# Patient Record
(demographics unavailable — no encounter records)

---

## 2024-11-19 NOTE — HEALTH RISK ASSESSMENT
[Very Good] : ~his/her~ current health as very good [Excellent] : ~his/her~  mood as  excellent [Yes] : Yes [4 or more  times a week (4 pts)] : 4 or more  times a week (4 points) [1 or 2 (0 pts)] : 1 or 2 (0 points) [Never (0 pts)] : Never (0 points) [No] : In the past 12 months have you used drugs other than those required for medical reasons? No [No falls in past year] : Patient reported no falls in the past year [0] : 2) Feeling down, depressed, or hopeless: Not at all (0) [PHQ-2 Negative - No further assessment needed] : PHQ-2 Negative - No further assessment needed [Never] : Never [Patient reported mammogram was normal] : Patient reported mammogram was normal [None] : None [With Significant Other] : lives with significant other [# of Members in Household ___] :  household currently consist of [unfilled] member(s) [Unemployed] : unemployed [College] : College [] :  [# Of Children ___] : has [unfilled] children [Sexually Active] : sexually active [Feels Safe at Home] : Feels safe at home [Fully functional (bathing, dressing, toileting, transferring, walking, feeding)] : Fully functional (bathing, dressing, toileting, transferring, walking, feeding) [Fully functional (using the telephone, shopping, preparing meals, housekeeping, doing laundry, using] : Fully functional and needs no help or supervision to perform IADLs (using the telephone, shopping, preparing meals, housekeeping, doing laundry, using transportation, managing medications and managing finances) [Smoke Detector] : smoke detector [Carbon Monoxide Detector] : carbon monoxide detector [Seat Belt] :  uses seat belt [Sunscreen] : uses sunscreen [Reviewed no changes] : Reviewed, no changes [Discussed at today's visit] : Advance Directives Discussed at today's visit [Designated Healthcare Proxy] : Designated healthcare proxy [Name: ___] : Health Care Proxy's Name: [unfilled]  [NO] : No [de-identified] : one wine daily [Audit-CScore] : 4 [de-identified] : WALKING DAILY [de-identified] : GOOD [BWW2Hrate] : 0 [Change in mental status noted] : No change in mental status noted [Reports changes in hearing] : Reports no changes in hearing [Reports changes in vision] : Reports no changes in vision [Reports changes in dental health] : Reports no changes in dental health [MammogramDate] : 05/24 [AdvancecareDate] : 11/24

## 2024-11-19 NOTE — COUNSELING
[None] : None [Good understanding] : Patient has a good understanding of lifestyle changes and the steps needed to achieve self management goals [de-identified] : Continue diet and exercise

## 2024-11-19 NOTE — HISTORY OF PRESENT ILLNESS
[FreeTextEntry1] : 84-year-old female with good general health with chronic medical history includes hypertension on amlodipine, hyperlipidemia for which she takes atorvastatin, carotid plaque without stenosis, osteopenia and skin cancer for which she follows with dermatology presents for her yearly physical.  Patient had a coronary calcium CAT scan February 2023 with score = 181 with echocardiogram February 2023 showing normal LVEF with mild to moderate MR and moderately severe TR. For her hyperlipidemia she is on atorvastatin For her hypertension she is on amlodipine.  The patient had a syncopal episode November 2022 with head trauma sustaining C7 fracture without surgical intervention and healed with immobilization with a cervical collar.   She recently had another syncopal episode October 2024 therefore saw cardiology with echocardiogram showing normal LVEF with grade 1 diastolic dysfunction with mild to moderate MR/TR. Also carotid duplex showing plaque without stenosis. Internal loop recorder recommended and has been inserted.  2019 the patient had lightheadedness and found to be hypertensive therefore amlodipine started with improved blood pressure without further symptoms.  Patient's main issues are known lumbar degenerative disc disease with right radiculopathy with chronic pain which is better.  Patient had a right total hip replacement May 2017. Also the patient had a trauma with resultant right patella fracture which was repaired December 2018 with patient having no continued issues.  The patient had issues with both of her shoulders July 2021 with MRIs showing tears therefore patient saw orthopedic with physical therapy with improvement.  She also has history of skin cancers most recently Mohs surgery Right face January 2021.  She also had cataract surgery on the right October 2020.  Otherwise she feels well without complaints including no chest pain, palpitations, shortness of breath or edema.  She is  with 4 children but unfortunately 1 child her son passed away from pancreatic cancer in 2023.

## 2024-11-19 NOTE — DATA REVIEWED
[FreeTextEntry1] : Coronary calcium CAT scan February 2023 with score = 181  Echocardiogram November 2024 with normal LVEF with grade 1 diastolic dysfunction with mild to moderate MR/TR Carotid duplex November 2024 with plaque without stenosis

## 2024-11-19 NOTE — PHYSICAL EXAM
[General Appearance - Alert] : alert [General Appearance - In No Acute Distress] : in no acute distress [Sclera] : the sclera and conjunctiva were normal [PERRL With Normal Accommodation] : pupils were equal in size, round, and reactive to light [Extraocular Movements] : extraocular movements were intact [Outer Ear] : the ears and nose were normal in appearance [Oropharynx] : the oropharynx was normal [Neck Appearance] : the appearance of the neck was normal [Neck Cervical Mass (___cm)] : no neck mass was observed [Jugular Venous Distention Increased] : there was no jugular-venous distention [Thyroid Diffuse Enlargement] : the thyroid was not enlarged [Thyroid Nodule] : there were no palpable thyroid nodules [Auscultation Breath Sounds / Voice Sounds] : lungs were clear to auscultation bilaterally [Heart Rate And Rhythm] : heart rate was normal and rhythm regular [Heart Sounds] : normal S1 and S2 [Heart Sounds Gallop] : no gallops [Murmurs] : no murmurs [Heart Sounds Pericardial Friction Rub] : no pericardial rub [Arterial Pulses Carotid] : carotid pulses were normal with no bruits [Abdominal Aorta] : the abdominal aorta was normal [Arterial Pulses Femoral] : femoral pulses were normal without bruits [Full Pulse] : the pedal pulses are present [Edema] : there was no peripheral edema [Veins - Varicosity Changes] : there were no varicosital changes [Bowel Sounds] : normal bowel sounds [Abdomen Soft] : soft [Abdomen Tenderness] : non-tender [Abdomen Mass (___ Cm)] : no abdominal mass palpated [Cervical Lymph Nodes Enlarged Posterior Bilaterally] : posterior cervical [Supraclavicular Lymph Nodes Enlarged Bilaterally] : supraclavicular [Cervical Lymph Nodes Enlarged Anterior Bilaterally] : anterior cervical [Axillary Lymph Nodes Enlarged Bilaterally] : axillary [Femoral Lymph Nodes Enlarged Bilaterally] : femoral [Inguinal Lymph Nodes Enlarged Bilaterally] : inguinal [No Spinal Tenderness] : no spinal tenderness [Abnormal Walk] : normal gait [Nail Clubbing] : no clubbing  or cyanosis of the fingernails [Musculoskeletal - Swelling] : no joint swelling seen [Motor Tone] : muscle strength and tone were normal [Skin Color & Pigmentation] : normal skin color and pigmentation [Skin Turgor] : normal skin turgor [] : no rash [Cranial Nerves] : cranial nerves 2-12 were intact [No Focal Deficits] : no focal deficits [Oriented To Time, Place, And Person] : oriented to person, place, and time [Impaired Insight] : insight and judgment were intact [Affect] : the affect was normal [FreeTextEntry1] : Ambulating with a cane

## 2024-11-19 NOTE — ASSESSMENT
[FreeTextEntry1] : 84-year-old female with good general health currently medically stable.  Coronary artery plaque with coronary calcium CAT scan February 2023 = 181 with patient having hyperlipidemia on atorvastatin. Echocardiogram November 2024 with normal LVEF with grade 1 diastolic dysfunction with mild to moderate MR/TR Carotid duplex November 2024 with plaque without stenosis  Blood pressure controlled therefore continue amlodipine.  Status post syncope with head trauma November 2022 with C7 fracture which healed without surgical intervention. Patient with a second syncopal episode October 2024 with cardiology evaluation showing no abnormalities with echocardiogram or carotid duplex to explain syncope.  Patient now with internal loop recorder.  Patient follows a good lifestyle with good diet, regular exercise and maintenance of her weight. Patient encouraged to continue present medications and supplements.  Colonoscopies Feb 2012 with followup in 10 years. at 81 years old no followup needed Cardiac stress test February 2016= normal Carotid duplex August 2017= normal Mammography May 2024 GYN yearly Bone density March 2022 = osteopenia  Vaccines up-to-date.including COVID High-dose influenza vaccine already received  Venipuncture done today in the office  Followup in 6 months.

## 2025-05-21 NOTE — HISTORY OF PRESENT ILLNESS
[FreeTextEntry1] : Pt presents for followup on HTN/hyperlipidemia/medication check. Patient is currently on Norvasc for HTN And on Lipitor for hyperlipidemia. -Overall stable, cardiology is monitoring loop recorder

## 2025-05-21 NOTE — ASSESSMENT
[FreeTextEntry1] : Venipuncture done in our office, Labs sent out Patient advised to continue present medications with diet/exercise and specialist followup.Patient will return to the office for complete physical exam in nov    vaccines are UTD, +got covid vaccines  Last mammogram =5/2024=rx given Bone density=5/2024 Last colonoscopy was February of 2012=no followup needed specialists include 1. gynecology- 2.dermatology-/plastics post mohs=Dr. Yi 3. Orthopedic-Dr. Castaneda/Dr. Heaton/Dr. Zavala 4.ophthalmology-Dr. Bonilla 5.N/S-Dr. Bobo= no follow-up needed 6.Cardio=Dr. Cooper CT calcium score = 2/2023 = 181 Echo via cardio